# Patient Record
Sex: MALE | Race: WHITE | Employment: FULL TIME | URBAN - METROPOLITAN AREA
[De-identification: names, ages, dates, MRNs, and addresses within clinical notes are randomized per-mention and may not be internally consistent; named-entity substitution may affect disease eponyms.]

---

## 2017-04-02 ENCOUNTER — APPOINTMENT (OUTPATIENT)
Dept: GENERAL RADIOLOGY | Facility: HOSPITAL | Age: 37
End: 2017-04-02
Payer: COMMERCIAL

## 2017-04-02 ENCOUNTER — HOSPITAL ENCOUNTER (EMERGENCY)
Facility: HOSPITAL | Age: 37
Discharge: HOME | End: 2017-04-02
Attending: PEDIATRICS
Payer: COMMERCIAL

## 2017-04-02 VITALS
OXYGEN SATURATION: 99 % | DIASTOLIC BLOOD PRESSURE: 87 MMHG | HEIGHT: 73 IN | WEIGHT: 175 LBS | BODY MASS INDEX: 23.19 KG/M2 | SYSTOLIC BLOOD PRESSURE: 136 MMHG | RESPIRATION RATE: 16 BRPM | TEMPERATURE: 98 F | HEART RATE: 60 BPM

## 2017-04-02 DIAGNOSIS — S69.91XA HAND INJURY, RIGHT, INITIAL ENCOUNTER: Primary | ICD-10-CM

## 2017-04-02 PROCEDURE — 73130 X-RAY EXAM OF HAND: CPT

## 2017-04-02 PROCEDURE — 99283 EMERGENCY DEPT VISIT LOW MDM: CPT

## 2017-04-02 NOTE — ED PROVIDER NOTES
Patient Seen in: [facility] Emergency Department    History   Patient presents with:  Upper Extremity Injury (musculoskeletal)    Stated Complaint: Left hand injury/pain    HPI    Patient is a 64-year-old male here with left hand pain.   He was playi 11/13/2012    Comment Procedure: DORSAL HUMP EXCISION RHINOPLASTY;  Surgeon: Chino Morales MD;  [facility] [place] [facility] NASAL SEPTUM N/A 11/11/2014    Comment Procedure: SEPTOPLASTY NASAL;  Surgeon: Zheng Paige MD;  Emogene Craft wheezes rales or rhonchi. Cardiac exam normal S1-S2, no murmurs rubs or gallops. Abdomen: Soft, nontender, nondistended. Bowel sounds present throughout. Extremities: Warm and well perfused. Dermatologic exam: No rashes or lesions.   Neurologic exam: C

## 2017-04-07 PROBLEM — M79.642 LEFT HAND PAIN: Status: ACTIVE | Noted: 2017-04-07

## 2017-04-07 PROBLEM — S63.92XA SPRAIN, HAND, LEFT, INITIAL ENCOUNTER: Status: ACTIVE | Noted: 2017-04-07

## 2017-06-11 ENCOUNTER — HOSPITAL ENCOUNTER (EMERGENCY)
Facility: HOSPITAL | Age: 37
Discharge: HOME | End: 2017-06-11
Attending: EMERGENCY MEDICINE
Payer: COMMERCIAL

## 2017-06-11 VITALS
WEIGHT: 175 LBS | SYSTOLIC BLOOD PRESSURE: 122 MMHG | HEIGHT: 73 IN | RESPIRATION RATE: 14 BRPM | TEMPERATURE: 98 F | HEART RATE: 74 BPM | OXYGEN SATURATION: 100 % | BODY MASS INDEX: 23.19 KG/M2 | DIASTOLIC BLOOD PRESSURE: 79 MMHG

## 2017-06-11 DIAGNOSIS — S81.811A LEG LACERATION, RIGHT, INITIAL ENCOUNTER: Primary | ICD-10-CM

## 2017-06-11 DIAGNOSIS — L08.9 BLISTER OF FOOT WITH INFECTION, RIGHT, INITIAL ENCOUNTER: ICD-10-CM

## 2017-06-11 DIAGNOSIS — S90.821A BLISTER OF FOOT WITH INFECTION, RIGHT, INITIAL ENCOUNTER: ICD-10-CM

## 2017-06-11 DIAGNOSIS — S90.822A FRICTION BLISTERS OF SOLE OF LEFT FOOT, INITIAL ENCOUNTER: ICD-10-CM

## 2017-06-11 PROCEDURE — 90471 IMMUNIZATION ADMIN: CPT

## 2017-06-11 PROCEDURE — 99283 EMERGENCY DEPT VISIT LOW MDM: CPT

## 2017-06-11 NOTE — ED INITIAL ASSESSMENT (HPI)
Pt was playing with boys and accidentally \"kicked\" a bed frame cutting his RLE. No active bleeding.

## 2017-06-11 NOTE — ED PROVIDER NOTES
Patient Seen in: [facility] Emergency Department    History   Patient presents with:  Laceration Abrasion (integumentary)    Stated Complaint: leg laceration    HPI  Patient is a 27-year-old male who last night around 10 PM accidentally struck his r ANTOINETTE [employer]ARBR[employer] PART ETHMOID  11/13/2012    Comment Procedure: DORSAL HUMP EXCISION RHINOPLASTY;  Surgeon: Omid Pedraza MD;  Location: [address] [place] [address] PART [facility]  11/13/2012    Comment Procedure: DORSAL HUMP EXCISION [facility] Resp 14  Ht 185.4 cm (6' 1\")  Wt 79.379 kg  BMI 23.09 kg/m2  SpO2 100%        Physical Exam  GENERAL: Patient resting comfortably on the cart in no acute distress. HEENT: Extraocular muscles intact  EXTREMITIES: Right lower extremity.   Mid anterior right

## 2017-09-06 ENCOUNTER — OFFICE VISIT (OUTPATIENT)
Dept: FAMILY MEDICINE CLINIC | Facility: CLINIC | Age: 37
End: 2017-09-06

## 2017-09-06 ENCOUNTER — APPOINTMENT (OUTPATIENT)
Dept: LAB | Age: 37
End: 2017-09-06
Attending: FAMILY MEDICINE
Payer: COMMERCIAL

## 2017-09-06 VITALS
SYSTOLIC BLOOD PRESSURE: 110 MMHG | BODY MASS INDEX: 22.84 KG/M2 | HEART RATE: 68 BPM | HEIGHT: 74 IN | OXYGEN SATURATION: 98 % | RESPIRATION RATE: 16 BRPM | DIASTOLIC BLOOD PRESSURE: 80 MMHG | WEIGHT: 178 LBS

## 2017-09-06 DIAGNOSIS — Z13.21 SCREENING FOR ENDOCRINE, NUTRITIONAL, METABOLIC AND IMMUNITY DISORDER: ICD-10-CM

## 2017-09-06 DIAGNOSIS — Z13.29 SCREENING FOR ENDOCRINE, NUTRITIONAL, METABOLIC AND IMMUNITY DISORDER: ICD-10-CM

## 2017-09-06 DIAGNOSIS — Z13.228 SCREENING FOR ENDOCRINE, NUTRITIONAL, METABOLIC AND IMMUNITY DISORDER: ICD-10-CM

## 2017-09-06 DIAGNOSIS — Z00.00 ADULT GENERAL MEDICAL EXAMINATION: Primary | ICD-10-CM

## 2017-09-06 DIAGNOSIS — Z13.0 SCREENING FOR ENDOCRINE, NUTRITIONAL, METABOLIC AND IMMUNITY DISORDER: ICD-10-CM

## 2017-09-06 DIAGNOSIS — Z23 NEED FOR PROPHYLACTIC VACCINATION AND INOCULATION AGAINST INFLUENZA: ICD-10-CM

## 2017-09-06 PROBLEM — M79.642 LEFT HAND PAIN: Status: RESOLVED | Noted: 2017-04-07 | Resolved: 2017-09-06

## 2017-09-06 PROBLEM — S63.92XA SPRAIN, HAND, LEFT, INITIAL ENCOUNTER: Status: RESOLVED | Noted: 2017-04-07 | Resolved: 2017-09-06

## 2017-09-06 LAB
ALBUMIN SERPL-MCNC: 4.1 G/DL (ref 3.5–4.8)
ALP LIVER SERPL-CCNC: 71 U/L (ref 45–117)
ALT SERPL-CCNC: 29 U/L (ref 17–63)
AST SERPL-CCNC: 22 U/L (ref 15–41)
BILIRUB SERPL-MCNC: 0.6 MG/DL (ref 0.1–2)
BUN BLD-MCNC: 20 MG/DL (ref 8–20)
CALCIUM BLD-MCNC: 9 MG/DL (ref 8.3–10.3)
CHLORIDE: 109 MMOL/L (ref 101–111)
CHOLEST SMN-MCNC: 205 MG/DL (ref ?–200)
CO2: 29 MMOL/L (ref 22–32)
CREAT BLD-MCNC: 0.97 MG/DL (ref 0.7–1.3)
ERYTHROCYTE [DISTWIDTH] IN BLOOD BY AUTOMATED COUNT: 13 % (ref 11.5–16)
GLUCOSE BLD-MCNC: 77 MG/DL (ref 70–99)
HCT VFR BLD AUTO: 46.5 % (ref 37–53)
HDLC SERPL-MCNC: 79 MG/DL (ref 45–?)
HDLC SERPL: 2.59 {RATIO} (ref ?–4.97)
HGB BLD-MCNC: 15.4 G/DL (ref 13–17)
LDLC SERPL CALC-MCNC: 114 MG/DL (ref ?–130)
LDLC SERPL-MCNC: 12 MG/DL (ref 5–40)
M PROTEIN MFR SERPL ELPH: 7.2 G/DL (ref 6.1–8.3)
MCH RBC QN AUTO: 29.6 PG (ref 27–33.2)
MCHC RBC AUTO-ENTMCNC: 33.1 G/DL (ref 31–37)
MCV RBC AUTO: 89.3 FL (ref 80–99)
NONHDLC SERPL-MCNC: 126 MG/DL (ref ?–130)
PLATELET # BLD AUTO: 144 10(3)UL (ref 150–450)
POTASSIUM SERPL-SCNC: 4.4 MMOL/L (ref 3.6–5.1)
RBC # BLD AUTO: 5.21 X10(6)UL (ref 4.3–5.7)
RED CELL DISTRIBUTION WIDTH-SD: 42.3 FL (ref 35.1–46.3)
SODIUM SERPL-SCNC: 142 MMOL/L (ref 136–144)
TRIGLYCERIDES: 62 MG/DL (ref ?–150)
TSI SER-ACNC: 1.32 MIU/ML (ref 0.35–5.5)
WBC # BLD AUTO: 4.8 X10(3) UL (ref 4–13)

## 2017-09-06 PROCEDURE — 36415 COLL VENOUS BLD VENIPUNCTURE: CPT | Performed by: FAMILY MEDICINE

## 2017-09-06 PROCEDURE — 80061 LIPID PANEL: CPT | Performed by: FAMILY MEDICINE

## 2017-09-06 PROCEDURE — 90471 IMMUNIZATION ADMIN: CPT | Performed by: FAMILY MEDICINE

## 2017-09-06 PROCEDURE — 80050 GENERAL HEALTH PANEL: CPT | Performed by: FAMILY MEDICINE

## 2017-09-06 PROCEDURE — 90686 IIV4 VACC NO PRSV 0.5 ML IM: CPT | Performed by: FAMILY MEDICINE

## 2017-09-06 PROCEDURE — 99385 PREV VISIT NEW AGE 18-39: CPT | Performed by: FAMILY MEDICINE

## 2017-09-06 NOTE — PROGRESS NOTES
Patient presents with:  Physical: NP work physical       HPI:  History of allergic rhinitis     - no restrictions in the past.     Exercises regularly - no issues. No changes in family history. No recent accidents or injuries.        Review Procedure: DORSAL HUMP EXCISION RHINOPLASTY;                 Surgeon: Ismael Penn MD;  Location: [address] [place] [address]  11/13/2012: NASAL SCOPY,OPEN MAXILL SINUS      Comment: Procedure: DORSAL HUMP EXCISION RHINOPLASTY; tobacco: Never Used                      Alcohol use: Yes           2.4 oz/week     Standard drinks or equivalent: 4 per week     Comment: social        Current Outpatient Prescriptions:  ibuprofen (ADVIL) 200 MG Oral Tab Take 200 mg by mouth every 6 (six) erythema. No pallor. Psychiatric: Normal mood and affect. A/P:    1. Adult general medical examination  - cont routine exercise and diet  - no restrictions for participation as     2.  Screening for endocrine, nutritional, metabolic and immunity

## 2019-10-30 PROBLEM — M25.861 IMPINGEMENT SYNDROME INVOLVING PATELLAR FAT PAD OF RIGHT KNEE: Status: ACTIVE | Noted: 2019-10-30

## 2020-02-05 PROBLEM — M25.861 IMPINGEMENT SYNDROME INVOLVING PATELLAR FAT PAD OF RIGHT KNEE: Status: RESOLVED | Noted: 2019-10-30 | Resolved: 2020-02-05

## 2021-03-19 ENCOUNTER — LAB ENCOUNTER (OUTPATIENT)
Dept: LAB | Age: 41
End: 2021-03-19
Attending: INTERNAL MEDICINE
Payer: COMMERCIAL

## 2021-03-19 DIAGNOSIS — Z86.010 PERSONAL HISTORY OF COLONIC POLYPS: ICD-10-CM

## 2021-03-20 LAB — SARS-COV-2 RNA RESP QL NAA+PROBE: NOT DETECTED

## 2021-03-22 ENCOUNTER — ANESTHESIA EVENT (OUTPATIENT)
Dept: ENDOSCOPY | Facility: HOSPITAL | Age: 41
End: 2021-03-22
Payer: COMMERCIAL

## 2021-03-22 ENCOUNTER — HOSPITAL ENCOUNTER (OUTPATIENT)
Facility: HOSPITAL | Age: 41
Setting detail: HOSPITAL OUTPATIENT SURGERY
Discharge: HOME | End: 2021-03-22
Attending: INTERNAL MEDICINE | Admitting: INTERNAL MEDICINE
Payer: COMMERCIAL

## 2021-03-22 ENCOUNTER — ANESTHESIA (OUTPATIENT)
Dept: ENDOSCOPY | Facility: HOSPITAL | Age: 41
End: 2021-03-22
Payer: COMMERCIAL

## 2021-03-22 VITALS
TEMPERATURE: 99 F | BODY MASS INDEX: 22.8 KG/M2 | HEART RATE: 68 BPM | RESPIRATION RATE: 16 BRPM | OXYGEN SATURATION: 100 % | HEIGHT: 73 IN | SYSTOLIC BLOOD PRESSURE: 114 MMHG | WEIGHT: 172 LBS | DIASTOLIC BLOOD PRESSURE: 73 MMHG

## 2021-03-22 DIAGNOSIS — Z86.010 PERSONAL HISTORY OF COLONIC POLYPS: Primary | ICD-10-CM

## 2021-03-22 DIAGNOSIS — Z86.010 HISTORY OF COLON POLYPS: ICD-10-CM

## 2021-03-22 PROCEDURE — 0DJD8ZZ INSPECTION OF LOWER INTESTINAL TRACT, VIA NATURAL OR ARTIFICIAL OPENING ENDOSCOPIC: ICD-10-PCS | Performed by: INTERNAL MEDICINE

## 2021-03-22 RX ORDER — SODIUM CHLORIDE, SODIUM LACTATE, POTASSIUM CHLORIDE, CALCIUM CHLORIDE 600; 310; 30; 20 MG/100ML; MG/100ML; MG/100ML; MG/100ML
INJECTION, SOLUTION INTRAVENOUS CONTINUOUS
Status: DISCONTINUED | OUTPATIENT
Start: 2021-03-22 | End: 2021-03-22

## 2021-03-22 RX ADMIN — SODIUM CHLORIDE, SODIUM LACTATE, POTASSIUM CHLORIDE, CALCIUM CHLORIDE: 600; 310; 30; 20 INJECTION, SOLUTION INTRAVENOUS at 11:11:00

## 2021-03-22 NOTE — ANESTHESIA PREPROCEDURE EVALUATION
PRE-OP EVALUATION    Patient Name: Cheri Ng    Admit Diagnosis: History of colon polyps [Z86.010]    Pre-op Diagnosis: History of colon polyps [Z86.010]    COLONOSCOPY    Anesthesia Procedure: COLONOSCOPY (N/A )    Surgeon(s) and Role:     * SEPTOPLASTY NASAL N/A 11/11/2014    Performed by Sanjuana Dick MD at [facility]   • TONSILLECTOMY Bilateral 1994   • VASECTOMY Bilateral 2013     Social History    Tobacco Use      Smoking status: Never Smoker      Smokeless tobacco: Never

## 2021-03-22 NOTE — ANESTHESIA POSTPROCEDURE EVALUATION
Collin Vail Patient Status:  Hospital Outpatient Surgery   Age/Gender 35year old male MRN [id number]   [facility] ENDOSCOPY Attending Jim Siddiqi MD   Hosp Day # 0 PCP Priscilla Melo MD       Anesthesia Post

## 2021-03-22 NOTE — OPERATIVE REPORT
[id number]  Champ Hong  3/14/1980  3/22/2021      Preoperative Diagnosis: Personal history of colon polyps  Postoperative Diagnosis: Hemorrhoids otherwise normal colonoscopy  Procedure Performed: Colonoscopy to the cecum   Colonoscopy withdrawa postoperative precautions.     IMPRESSION:  Hemorrhoids otherwise normal colonoscopy to the cecum    PLAN:      Colonoscopy in 5 years if remained asymptomatic    Shilpi Zamarripa MD

## 2024-10-09 ENCOUNTER — HOSPITAL ENCOUNTER (OUTPATIENT)
Age: 44
Discharge: HOME | End: 2024-10-09
Payer: COMMERCIAL

## 2024-10-09 VITALS
TEMPERATURE: 98 F | RESPIRATION RATE: 16 BRPM | BODY MASS INDEX: 23.19 KG/M2 | WEIGHT: 175 LBS | HEIGHT: 73 IN | DIASTOLIC BLOOD PRESSURE: 83 MMHG | OXYGEN SATURATION: 100 % | SYSTOLIC BLOOD PRESSURE: 114 MMHG | HEART RATE: 69 BPM

## 2024-10-09 DIAGNOSIS — J01.01 ACUTE RECURRENT MAXILLARY SINUSITIS: Primary | ICD-10-CM

## 2024-10-09 PROCEDURE — 99203 OFFICE O/P NEW LOW 30 MIN: CPT | Performed by: NURSE PRACTITIONER

## 2024-10-09 RX ORDER — AMOXICILLIN 875 MG
875 TABLET ORAL 2 TIMES DAILY
Qty: 14 TABLET | Refills: 0 | Status: SHIPPED | OUTPATIENT
Start: 2024-10-09 | End: 2024-10-16

## 2024-10-09 NOTE — ED PROVIDER NOTES
Patient Seen in: [facility]      History     Chief Complaint   Patient presents with    Cough/URI    Post Nasal Drip    Sinus Problem     Stated Complaint: cough congestion  Subjective:   44-year-old male with recurrent sinusitis and rhinitis presents from home.  Patient is here with concern for sinus infection.  States 2 weeks of cough, sinus pressure, postnasal drip.  States he had a fever the first few days but none recently.  Notes symptoms of initially improved but now have increased over the last few days.  Notes used to get sinus infections several times a year but then had sinus surgery.  Notes no infections for many years.  He has been taking Advil sinus and using Afrin at home.  He had a negative COVID test at home.  He is not currently taking any allergy medications.  He is a non-smoker.    The history is provided by the patient. No  was used.     Objective:   Past Medical History:    Allergic rhinitis due to pollen    Anesthesia complication    angry/agitation post longer procedures    History of colon polyps    Tubular adenoma of colon            Past Surgical History:   Procedure Laterality Date    Colonoscopy N/A 3/8/2016    Procedure: COLONOSCOPY;  Surgeon: Malik Ward MD;  Location:  ENDOSCOPY    Colonoscopy N/A 3/22/2021    Procedure: COLONOSCOPY;  Surgeon: Malik Ward MD;  Location:  ENDOSCOPY    Excision turbinate,submucous  11/13/2012    Procedure: DORSAL HUMP EXCISION RHINOPLASTY;  Surgeon: Ramses Alexander MD;  Location: [facility]    Excision turbinate,submucous  11/13/2012    Procedure: DORSAL HUMP EXCISION RHINOPLASTY;  Surgeon: Ramses Alexander MD;  Location: [facility]    Nasal scopy,open maxill sinus  11/13/2012    Procedure: DORSAL HUMP EXCISION RHINOPLASTY;  Surgeon: Ramses Alexander MD;  Location: [facility]    Nasal scopy,open maxill sinus  11/13/2012    Procedure: DORSAL HUMP EXCISION  RHINOPLASTY;  Surgeon: Ramses Alexander MD;  Location: [facility]    Nasal scopy,remv part ethmoid  11/13/2012    Procedure: DORSAL HUMP EXCISION RHINOPLASTY;  Surgeon: Ramses Alexander MD;  Location: [facility]    Nasal scopy,remv part ethmoid  11/13/2012    Procedure: DORSAL HUMP EXCISION RHINOPLASTY;  Surgeon: Ramses Alexander MD;  Location: [facility]    Nasal surg proc unlisted  11/13/2012    Procedure: ENDOSCOPY,  SINUS SEPTOPLASTY,ANTROSTOMIES;  Surgeon: Ramses Alexander MD;  Location: [facility]    Other      left wrist surgery- shattered wrist 2010    Other      [place] teeth    Repair of nasal septum  11/13/2012    Procedure: DORSAL HUMP EXCISION RHINOPLASTY;  Surgeon: Ramses Alexander MD;  Location: [facility]    Repair of nasal septum N/A 11/11/2014    Procedure: SEPTOPLASTY NASAL;  Surgeon: José Antonio Barbosa MD;  Location: [facility]    Tonsillectomy Bilateral 1994    Vasectomy Bilateral 2013              Social History     Socioeconomic History    Marital status:      Spouse name: Janina    Number of children: 2    Years of education: 16   Occupational History    Occupation: Technology   Tobacco Use    Smoking status: Never    Smokeless tobacco: Never   Vaping Use    Vaping status: Never Used   Substance and Sexual Activity    Alcohol use: Yes     Alcohol/week: 4.0 standard drinks of alcohol     Types: 4 Standard drinks or equivalent per week     Comment: social    Drug use: No    Sexual activity: Yes     Partners: Female   Other Topics Concern     Service No    Blood Transfusions No    Caffeine Concern No    Occupational Exposure No    Hobby Hazards No    Sleep Concern No    Stress Concern No    Weight Concern No    Special Diet No    Back Care No    Exercise Yes     Comment: running, weights    Bike Helmet No    Seat Belt Yes    Self-Exams Yes   Social History Narrative    Lives with wife and  children    Enjoys snowboarding, golf, flying            Review of Systems    Positive for stated complaint: Cough/URI, Post Nasal Drip, and Sinus Problem    Other systems are as noted in HPI.  Constitutional and vital signs reviewed.      All other systems reviewed and negative except as noted above.    Physical Exam     ED Triage Vitals [10/09/24 1208]   /83   Pulse 69   Resp 16   Temp 97.9 °F (36.6 °C)   Temp src Temporal   SpO2 100 %   O2 Device None (Room air)     Current:/83   Pulse 69   Temp 97.9 °F (36.6 °C) (Temporal)   Resp 16   Ht 185.4 cm (6' 1\")   Wt 79.4 kg   SpO2 100%   BMI 23.09 kg/m²     Physical Exam  Vitals and nursing note reviewed.   Constitutional:       General: He is not in acute distress.     Appearance: Normal appearance. He is not ill-appearing or toxic-appearing.   HENT:      Head: Normocephalic and atraumatic.      Right Ear: Tympanic membrane, ear canal and external ear normal.      Left Ear: Tympanic membrane, ear canal and external ear normal.      Nose: Nose normal. No mucosal edema.      Right Turbinates: Not enlarged.      Left Turbinates: Not enlarged.      Right Sinus: No maxillary sinus tenderness or frontal sinus tenderness.      Left Sinus: No maxillary sinus tenderness or frontal sinus tenderness.      Mouth/Throat:      Mouth: Mucous membranes are moist.      Pharynx: Oropharynx is clear. No pharyngeal swelling or posterior oropharyngeal erythema.      Tonsils: No tonsillar exudate.   Eyes:      Pupils: Pupils are equal, round, and reactive to light.   Cardiovascular:      Rate and Rhythm: Normal rate and regular rhythm.      Pulses: Normal pulses.   Pulmonary:      Effort: Pulmonary effort is normal. No respiratory distress.      Breath sounds: Normal breath sounds.      Comments: Lungs clear.  No adventitious lung sounds.  No distress.  No hypoxia.  Pulse ox 100% ra. Which is normal    Abdominal:      General: Abdomen is flat.      Palpations: Abdomen is  soft.   Musculoskeletal:         General: No signs of injury. Normal range of motion.      Cervical back: Normal range of motion and neck supple.   Lymphadenopathy:      Cervical: No cervical adenopathy.   Skin:     General: Skin is warm and dry.      Capillary Refill: Capillary refill takes less than 2 seconds.   Neurological:      General: No focal deficit present.      Mental Status: He is alert and oriented to person, place, and time.      GCS: GCS eye subscore is 4. GCS verbal subscore is 5. GCS motor subscore is 6.   Psychiatric:         Mood and Affect: Mood normal.         Behavior: Behavior normal.         Thought Content: Thought content normal.         Judgment: Judgment normal.         ED Course   Radiology:  No results found.  Labs Reviewed - No data to display    MDM     Medical Decision Making  Differential diagnoses reflecting the complexity of care include: Viral versus bacterial sinusitis, COVID  Patient is well-appearing on exam.  Afebrile.  No sinus tenderness.  No facial swelling.  No significant turbinate swelling.  Shared decision making with patient.  Had negative COVID test at home.  Offered repeat testing here.  Patient declined.  Discussed viral versus bacterial sinusitis.  Patient would like to start antibiotics given 2 weeks of symptoms with history of recurrent sinusitis and no relief with OTC medications    Plan of Care: Amoxicillin.  Continue sinus medications.  Use saline nasal spray and warm steam to your face.  Follow-up with primary doctor if no improvement    Results and plan of care discussed with the patient/family. They are in agreement with discharge. They understand to follow up with their primary doctor or the referral physician for further evaluation, especially if no improvement.  Also discussed the limitations of immediate care, patient is aware that if symptoms are worse they should go to the emergency room. Verbal and written discharge instructions were given.     My  independent interpretation of studies of: N/A  Diagnostic tests and medications considered but not ordered were: COVID  Shared decision making was done by: Patient declined COVID test  Comorbidities that add complexity to management include: Rhinitis, sinusitis  External chart review was done and was noted: Reviewed, no recent antibiotics on chart  History obtained by an independent source was from: N/A  Discussions and management was done with: N/A  Social determinants of health that affect care: N/A              Problems Addressed:  Acute recurrent maxillary sinusitis: acute illness or injury    Risk  OTC drugs.  Prescription drug management.        Disposition and Plan     Clinical Impression:  1. Acute recurrent maxillary sinusitis         Disposition:  Discharge  10/9/2024 12:32 pm    Follow-up:  Bridger Johnston MD  [facility] [place] [address]  [phone]                Medications Prescribed:  Current Discharge Medication List        START taking these medications    Details   amoxicillin 875 MG Oral Tab Take 1 tablet (875 mg total) by mouth 2 (two) times daily for 7 days.  Qty: 14 tablet, Refills: 0

## 2024-10-09 NOTE — ED INITIAL ASSESSMENT (HPI)
Pt c/o 2 weeks of intermittent cough, sinus pressure and post nasal drip. Pt states he's tried OTC meds minimal relief. Pt states he thinks he needs an antibiotic.

## 2024-10-09 NOTE — DISCHARGE INSTRUCTIONS
Take amoxicillin twice a day until gone.  Continue your over-the-counter remedies.  Warm steam to your face, saline nasal spray.  Follow-up with your primary doctor or ENT if no improvement

## (undated) DEVICE — FILTERLINE NASAL ADULT O2/CO2

## (undated) DEVICE — 3M™ RED DOT™ MONITORING ELECTRODE WITH FOAM TAPE AND STICKY GEL, 50/BAG, 20/CASE, 72/PLT 2570: Brand: RED DOT™

## (undated) DEVICE — ENDOSCOPY PACK - LOWER: Brand: MEDLINE INDUSTRIES, INC.

## (undated) DEVICE — CAP SEALING, REVEAL 15.0MM

## (undated) DEVICE — 1200CC GUARDIAN II: Brand: GUARDIAN

## (undated) DEVICE — Device: Brand: DEFENDO AIR/WATER/SUCTION AND BIOPSY VALVE

## (undated) NOTE — ED AVS SNAPSHOT
[facility] Emergency Department    [place]  [address] [address]    Phone:  [phone]    Fax:  [phone]   MRN: [id number]    Department:  [facility] Emergency Department   Date of Visit:  6 To Check ER Wait Times:  TEXT 'ERwait' to 69298      Click [email or web address]      Or call [phone]    If you have any problems with your follow-up, please call our  at [phone]    Si usted tiene algun problema con linda sequimiento, por f I have read and understand the instructions given to me by my caregivers. 24-Hour Pharmacies        Pharmacy Address Phone Number   [facility] [address] ([address]) [place] If you've recently had a stay at the Hospital you can access your discharge instructions in [employer] by going to Visits < Admission Summaries.  If you've been to the Emergency Department or your doctor's office, you can view your past visit information in My

## (undated) NOTE — ED AVS SNAPSHOT
[facility] Emergency Department    [place]  [address] [address]    Phone:  [phone]    Fax:  [phone]   MRN: [id number]    Department:  [facility] Emergency Department   Date of Visit:  6 IF THERE IS ANY CHANGE OR WORSENING OF YOUR CONDITION, CALL YOUR PRIMARY CARE PHYSICIAN AT ONCE OR RETURN IMMEDIATELY TO THE EMERGENCY DEPARTMENT.     If you have been prescribed any medication(s), please fill your prescription right away and begin taking t

## (undated) NOTE — ED AVS SNAPSHOT
[facility] Emergency Department    [place]  [address] [address]    Phone:  [phone]    Fax:  [phone]   MRN: [id number]    Department:  [facility] Emergency Department   Date of Visit:  4 You were examined and treated today on an urgent basis only. This was not a substitute for ongoing medical care. Often, one Emergency Department visit does not uncover every injury or illness.  If you have been referred to a primary care or a specialist ph Liat Rodriguez[address] ([address]) [address]  Harshal Eamon (Balwinder Rainey) [phone] [address] ([address]) [phone]                Additional Information       We are concerned for your o [email or web address]. If you've recently had a stay at the Hospital you can access your discharge instructions in [employer] by going to Visits < Admission Summaries.  If you've been to the Emergency Department or your doctor's office, you can view yo

## (undated) NOTE — ED AVS SNAPSHOT
[facility] Emergency Department    [place]  [address] [address]    Phone:  [phone]    Fax:  [phone]   MRN: [id number]    Department:  [facility] Emergency Department   Date of Visit:  4 IF THERE IS ANY CHANGE OR WORSENING OF YOUR CONDITION, CALL YOUR PRIMARY CARE PHYSICIAN AT ONCE OR RETURN IMMEDIATELY TO THE EMERGENCY DEPARTMENT.     If you have been prescribed any medication(s), please fill your prescription right away and begin taking t